# Patient Record
Sex: MALE | Race: ASIAN | NOT HISPANIC OR LATINO | URBAN - METROPOLITAN AREA
[De-identification: names, ages, dates, MRNs, and addresses within clinical notes are randomized per-mention and may not be internally consistent; named-entity substitution may affect disease eponyms.]

---

## 2021-04-06 ENCOUNTER — OFFICE (OUTPATIENT)
Dept: URBAN - METROPOLITAN AREA CLINIC 79 | Facility: CLINIC | Age: 56
End: 2021-04-06

## 2021-04-06 ENCOUNTER — OFFICE (OUTPATIENT)
Dept: URBAN - METROPOLITAN AREA CLINIC 79 | Facility: CLINIC | Age: 56
End: 2021-04-06
Payer: MEDICAID

## 2021-04-06 VITALS
HEART RATE: 65 BPM | DIASTOLIC BLOOD PRESSURE: 82 MMHG | HEIGHT: 64 IN | SYSTOLIC BLOOD PRESSURE: 125 MMHG | WEIGHT: 148.9 LBS

## 2021-04-06 DIAGNOSIS — K62.5 HEMORRHAGE OF ANUS AND RECTUM: ICD-10-CM

## 2021-04-06 PROCEDURE — 00038: CPT | Performed by: INTERNAL MEDICINE

## 2021-04-06 PROCEDURE — 99204 OFFICE O/P NEW MOD 45 MIN: CPT | Performed by: PHYSICIAN ASSISTANT

## 2021-04-06 NOTE — SERVICEHPINOTES
ODILIA MARTINEZ   is a   56   year old  male who is being seen in consultation at the request of   AUBREE MUNROE   for ov prior to colonoscopy.  He is accompanied by daughter who is translating [place] language to English. He reports intermittent episodes of BRBPR seen on wipe and in toilet bowel water: Episodes about every an few weeks and began 3-4 yrs ago. He has BMs 1 x/day, BSS type 4: No anal/rectal pain. No knwn cardiac or pulmonary dieease. Denies chest pain, n/v, abdominal pain, change in BMs, diarrhea, constipation, melena, weight loss. No other complaints.

## 2021-05-12 ENCOUNTER — ON CAMPUS - OUTPATIENT (OUTPATIENT)
Dept: URBAN - METROPOLITAN AREA HOSPITAL 37 | Facility: HOSPITAL | Age: 56
End: 2021-05-12
Payer: MEDICAID

## 2021-05-12 DIAGNOSIS — K63.5 POLYP OF COLON: ICD-10-CM

## 2021-05-12 DIAGNOSIS — K62.5 HEMORRHAGE OF ANUS AND RECTUM: ICD-10-CM

## 2021-05-12 PROCEDURE — 45380 COLONOSCOPY AND BIOPSY: CPT | Performed by: INTERNAL MEDICINE
